# Patient Record
Sex: FEMALE | Race: WHITE | NOT HISPANIC OR LATINO | Employment: PART TIME | URBAN - METROPOLITAN AREA
[De-identification: names, ages, dates, MRNs, and addresses within clinical notes are randomized per-mention and may not be internally consistent; named-entity substitution may affect disease eponyms.]

---

## 2020-01-15 PROBLEM — M54.12 BRACHIAL NEURITIS: Status: ACTIVE | Noted: 2020-01-15

## 2020-10-15 DIAGNOSIS — Z01.818 PREOP TESTING: ICD-10-CM

## 2020-10-25 ENCOUNTER — CLINICAL SUPPORT (OUTPATIENT)
Dept: URGENT CARE | Facility: CLINIC | Age: 55
End: 2020-10-25
Payer: COMMERCIAL

## 2020-10-25 DIAGNOSIS — Z01.818 PREOP TESTING: ICD-10-CM

## 2020-10-25 PROCEDURE — U0003 INFECTIOUS AGENT DETECTION BY NUCLEIC ACID (DNA OR RNA); SEVERE ACUTE RESPIRATORY SYNDROME CORONAVIRUS 2 (SARS-COV-2) (CORONAVIRUS DISEASE [COVID-19]), AMPLIFIED PROBE TECHNIQUE, MAKING USE OF HIGH THROUGHPUT TECHNOLOGIES AS DESCRIBED BY CMS-2020-01-R: HCPCS

## 2020-10-25 PROCEDURE — U0002 PR SARS-COV-2 COVID-19 ANY TECHNIQUE, MULT TYPE/SUBTYPE/TARGET: ICD-10-PCS | Mod: S$GLB,,, | Performed by: NURSE PRACTITIONER

## 2020-10-25 PROCEDURE — U0002 COVID-19 LAB TEST NON-CDC: HCPCS | Mod: S$GLB,,, | Performed by: NURSE PRACTITIONER

## 2020-10-26 LAB — SARS-COV-2 RNA RESP QL NAA+PROBE: NOT DETECTED

## 2021-05-10 ENCOUNTER — PATIENT MESSAGE (OUTPATIENT)
Dept: RESEARCH | Facility: HOSPITAL | Age: 56
End: 2021-05-10

## 2023-08-17 ENCOUNTER — OFFICE VISIT (OUTPATIENT)
Dept: PAIN MEDICINE | Facility: CLINIC | Age: 58
End: 2023-08-17
Payer: COMMERCIAL

## 2023-08-17 VITALS
DIASTOLIC BLOOD PRESSURE: 69 MMHG | HEIGHT: 61 IN | BODY MASS INDEX: 22.15 KG/M2 | WEIGHT: 117.31 LBS | HEART RATE: 78 BPM | SYSTOLIC BLOOD PRESSURE: 140 MMHG

## 2023-08-17 DIAGNOSIS — M54.12 CERVICAL RADICULOPATHY: ICD-10-CM

## 2023-08-17 DIAGNOSIS — M54.12 CERVICAL RADICULITIS: Primary | ICD-10-CM

## 2023-08-17 PROCEDURE — 1159F MED LIST DOCD IN RCRD: CPT | Mod: CPTII,S$GLB,, | Performed by: ANESTHESIOLOGY

## 2023-08-17 PROCEDURE — 3077F SYST BP >= 140 MM HG: CPT | Mod: CPTII,S$GLB,, | Performed by: ANESTHESIOLOGY

## 2023-08-17 PROCEDURE — 3008F BODY MASS INDEX DOCD: CPT | Mod: CPTII,S$GLB,, | Performed by: ANESTHESIOLOGY

## 2023-08-17 PROCEDURE — 3077F PR MOST RECENT SYSTOLIC BLOOD PRESSURE >= 140 MM HG: ICD-10-PCS | Mod: CPTII,S$GLB,, | Performed by: ANESTHESIOLOGY

## 2023-08-17 PROCEDURE — 99999 PR PBB SHADOW E&M-EST. PATIENT-LVL III: ICD-10-PCS | Mod: PBBFAC,,, | Performed by: ANESTHESIOLOGY

## 2023-08-17 PROCEDURE — 3078F PR MOST RECENT DIASTOLIC BLOOD PRESSURE < 80 MM HG: ICD-10-PCS | Mod: CPTII,S$GLB,, | Performed by: ANESTHESIOLOGY

## 2023-08-17 PROCEDURE — 3078F DIAST BP <80 MM HG: CPT | Mod: CPTII,S$GLB,, | Performed by: ANESTHESIOLOGY

## 2023-08-17 PROCEDURE — 99999 PR PBB SHADOW E&M-EST. PATIENT-LVL III: CPT | Mod: PBBFAC,,, | Performed by: ANESTHESIOLOGY

## 2023-08-17 PROCEDURE — 1159F PR MEDICATION LIST DOCUMENTED IN MEDICAL RECORD: ICD-10-PCS | Mod: CPTII,S$GLB,, | Performed by: ANESTHESIOLOGY

## 2023-08-17 PROCEDURE — 99204 PR OFFICE/OUTPT VISIT, NEW, LEVL IV, 45-59 MIN: ICD-10-PCS | Mod: S$GLB,,, | Performed by: ANESTHESIOLOGY

## 2023-08-17 PROCEDURE — 99204 OFFICE O/P NEW MOD 45 MIN: CPT | Mod: S$GLB,,, | Performed by: ANESTHESIOLOGY

## 2023-08-17 PROCEDURE — 3008F PR BODY MASS INDEX (BMI) DOCUMENTED: ICD-10-PCS | Mod: CPTII,S$GLB,, | Performed by: ANESTHESIOLOGY

## 2023-08-17 RX ORDER — GABAPENTIN 300 MG/1
300 CAPSULE ORAL NIGHTLY
Qty: 30 CAPSULE | Refills: 2 | Status: SHIPPED | OUTPATIENT
Start: 2023-08-17 | End: 2024-08-16

## 2023-08-17 RX ORDER — SODIUM CHLORIDE, SODIUM LACTATE, POTASSIUM CHLORIDE, CALCIUM CHLORIDE 600; 310; 30; 20 MG/100ML; MG/100ML; MG/100ML; MG/100ML
INJECTION, SOLUTION INTRAVENOUS CONTINUOUS
Status: CANCELLED | OUTPATIENT
Start: 2023-09-01

## 2023-08-17 NOTE — H&P (VIEW-ONLY)
This note was completed with dictation software and grammatical errors may exist.    Chief Complaint   Patient presents with    Neck Pain    Shoulder Pain    Arm Pain        HPI: Kiki Milner is a 57 y.o. year old female patient who has a past medical history of Arm pain, left, PONV (postoperative nausea and vomiting), and Shoulder pain. She presents in referral from [facility] for left neck and arm pain.  Patient reports that beginning in 2019 she was having severe left neck pain and had gone through physical therapy, medications without relief.  She ended up having a cervical epidural steroid injection and her pain resolved for several years.  She reports that a couple years later her pain returned, had done some injections without much relief but it was moderately well controlled until recently.  In the last 6 months it has been worsening, located in the left neck but now it is radiating down the left arm causing numbness and tingling in her 2nd through 5th digits.  Pain is into her left scapula as well.  She states that the pain is fairly constant but becomes more severe especially with long work days.  She describes the pain as aching, throbbing, tight and deep.  It progresses as the day goes on.  She does get some relief with rest, lying down and Aleve.  She had her last injection in the fall of 2022 but did not have relief and so after this had a new MRI done that we reviewed below.  She denies any dennis weakness, no bowel or bladder incontinence, no balance changes.      Pain intervention history:  She has had epidurals in the past the 1st cervical epidural in 2019 provided excellent relief.  However subsequent injections several times did not provide as much benefit.    Spine surgeries:    Antineuropathics:  NSAIDs:  Aleve  Physical therapy:  She had done physical therapy 4 years ago for several months without relief.  Recently she has been doing the exercises that she has learned through physician  "supervision, has done more than several months of exercise without benefit.  Antidepressants:  Muscle relaxers:  Opioids:  Antiplatelets/Anticoagulants:        ROS:  She reports headaches, neck pain.  Balance of review of systems is negative.    No results found for: "LABA1C", "HGBA1C"    Lab Results   Component Value Date    WBC 5.7 09/22/2008    HGB 12.3 09/22/2008    HCT 35.5 (L) 09/22/2008    MCV 92.3 09/22/2008     (H) 09/22/2008             Past Medical History:   Diagnosis Date    Arm pain, left     PONV (postoperative nausea and vomiting)     Shoulder pain        Past Surgical History:   Procedure Laterality Date    breast augmentation      EPIDURAL STEROID INJECTION INTO CERVICAL SPINE N/A 1/15/2020    Procedure: INJECTION, STEROID, SPINE, CERVICAL, EPIDURAL c7-T1;  Surgeon: Bryant Kaur MD;  Location: [facility];  Service: Pain Management;  Laterality: N/A;    RHINOPLASTY         Social History     Socioeconomic History    Marital status:    Tobacco Use    Smoking status: Never    Smokeless tobacco: Never   Substance and Sexual Activity    Alcohol use: No    Drug use: No         Medications/Allergies: See med card    Vitals:    08/17/23 1031   BP: (!) 140/69   Pulse: 78   Weight: 53.2 kg (117 lb 4.6 oz)   Height: 5' 1" (1.549 m)   PainSc:   4   PainLoc: Back     Body mass index is 22.16 kg/m².    Physical exam:  Gen: A and O x3, pleasant, well-groomed  Skin: No rashes or obvious lesions  HEENT: PERRLA, no obvious deformities on ears or in canals.Trachea midline.  CVS: Regular rate and rhythm, normal palpable pulses.  Resp: Clear to auscultation bilaterally, no wheezes or rales.  Abdomen: Soft, NT/ND.  Musculoskeletal:  No antalgic gait.       Neuro:  Motor:    Right Left   C4 Shoulder Abduction  5  5   C5 Elbow Flexion    5  5   C6 Wrist Extension  5  5   C7 Elbow Extension   5  5   C8/T1 Hand Intrinsics   5  5   C8 First Dorsal Interosseus  5  5   C8 Abductor Pollicus Brevis  5  5      " Left  Right    Triceps DTR 2+ 2+   Biceps DTR 2+ 2+   Brachioradialis DTR 2+ 2+   Patellar DTR 2+ 2+   Achilles DTR 2+ 2+   Olivo Absent  Absent   Clonus Absent Absent     Babinski Absent Absent     Sensory: Intact and symmetrical to light touch and pinprick in C2-T1 dermatomes bilaterally.  Cervical spine: ROM is full in flexion, extension and lateral rotation with increased pain on left lateral rotation and extension, also with flexion   Spurling's maneuver causes no neck pain to either side.  Myofascial exam: No Tenderness to palpation across cervical paraspinous region bilaterally.    Imaging:  We reviewed cervical MRIs from 2019 and in 2022: Last was 10/11/2022 from [place]: Most notably there are broad-based disc bulges at C5/6 with leftward protrusion at C5/6 towards the cord but right greater than left foraminal narrowing at that level.  At C6/7 there is moderate to severe foraminal narrowing out to the left side.    Assessment:  Kiki Milner is a 57 y.o. year old female patient who has a past medical history of Arm pain, left, PONV (postoperative nausea and vomiting), and Shoulder pain. She presents in referral from [facility] for left neck and arm pain.  1. Cervical radiculitis        2. Cervical radiculopathy  Vital signs    Place 18-22 gaua peripheral IV     Verify informed consent    Notify physician     Notify physician     Notify physician (specify)    Diet NPO    Case Request Operating Room: Injection-steroid-epidural-cervical C6/7 TO LEFT    Place in Outpatient    lactated ringers infusion          Plan:  1. We reviewed her symptoms, exam and cervical spine MRIs.  She does have disc degeneration at C5/6 which pushes out to the left side of the cord but also has significant foraminal narrowing at C6/7 which I think is likely causing the symptoms into her left arm.  We discussed the role of epidural steroid injections and she would like to proceed.  We discussed that she has done  exercises, I would also try gabapentin 300 mg nightly.  We discussed that if epidural steroid injections are not helping her radicular pain, likely the only other option would be surgery but she feels that her pain is probably not at that point but she would definitely like to see if her pain can be reduced right now.  I am going to set her up with a interlaminar C6/7 JEFFREY with spread to the left side.  We will see her in follow-up in several weeks after the injection or sooner as needed.  Thank you for referring this interesting patient, and I look forward to continuing to collaborate in her care.

## 2023-08-17 NOTE — PROGRESS NOTES
This note was completed with dictation software and grammatical errors may exist.    Chief Complaint   Patient presents with    Neck Pain    Shoulder Pain    Arm Pain        HPI: Kiki Milner is a 57 y.o. year old female patient who has a past medical history of Arm pain, left, PONV (postoperative nausea and vomiting), and Shoulder pain. She presents in referral from [facility] for left neck and arm pain.  Patient reports that beginning in 2019 she was having severe left neck pain and had gone through physical therapy, medications without relief.  She ended up having a cervical epidural steroid injection and her pain resolved for several years.  She reports that a couple years later her pain returned, had done some injections without much relief but it was moderately well controlled until recently.  In the last 6 months it has been worsening, located in the left neck but now it is radiating down the left arm causing numbness and tingling in her 2nd through 5th digits.  Pain is into her left scapula as well.  She states that the pain is fairly constant but becomes more severe especially with long work days.  She describes the pain as aching, throbbing, tight and deep.  It progresses as the day goes on.  She does get some relief with rest, lying down and Aleve.  She had her last injection in the fall of 2022 but did not have relief and so after this had a new MRI done that we reviewed below.  She denies any dennis weakness, no bowel or bladder incontinence, no balance changes.      Pain intervention history:  She has had epidurals in the past the 1st cervical epidural in 2019 provided excellent relief.  However subsequent injections several times did not provide as much benefit.    Spine surgeries:    Antineuropathics:  NSAIDs:  Aleve  Physical therapy:  She had done physical therapy 4 years ago for several months without relief.  Recently she has been doing the exercises that she has learned through physician  "supervision, has done more than several months of exercise without benefit.  Antidepressants:  Muscle relaxers:  Opioids:  Antiplatelets/Anticoagulants:        ROS:  She reports headaches, neck pain.  Balance of review of systems is negative.    No results found for: "LABA1C", "HGBA1C"    Lab Results   Component Value Date    WBC 5.7 09/22/2008    HGB 12.3 09/22/2008    HCT 35.5 (L) 09/22/2008    MCV 92.3 09/22/2008     (H) 09/22/2008             Past Medical History:   Diagnosis Date    Arm pain, left     PONV (postoperative nausea and vomiting)     Shoulder pain        Past Surgical History:   Procedure Laterality Date    breast augmentation      EPIDURAL STEROID INJECTION INTO CERVICAL SPINE N/A 1/15/2020    Procedure: INJECTION, STEROID, SPINE, CERVICAL, EPIDURAL c7-T1;  Surgeon: Bryant Kaur MD;  Location: [facility];  Service: Pain Management;  Laterality: N/A;    RHINOPLASTY         Social History     Socioeconomic History    Marital status:    Tobacco Use    Smoking status: Never    Smokeless tobacco: Never   Substance and Sexual Activity    Alcohol use: No    Drug use: No         Medications/Allergies: See med card    Vitals:    08/17/23 1031   BP: (!) 140/69   Pulse: 78   Weight: 53.2 kg (117 lb 4.6 oz)   Height: 5' 1" (1.549 m)   PainSc:   4   PainLoc: Back     Body mass index is 22.16 kg/m².    Physical exam:  Gen: A and O x3, pleasant, well-groomed  Skin: No rashes or obvious lesions  HEENT: PERRLA, no obvious deformities on ears or in canals.Trachea midline.  CVS: Regular rate and rhythm, normal palpable pulses.  Resp: Clear to auscultation bilaterally, no wheezes or rales.  Abdomen: Soft, NT/ND.  Musculoskeletal:  No antalgic gait.       Neuro:  Motor:    Right Left   C4 Shoulder Abduction  5  5   C5 Elbow Flexion    5  5   C6 Wrist Extension  5  5   C7 Elbow Extension   5  5   C8/T1 Hand Intrinsics   5  5   C8 First Dorsal Interosseus  5  5   C8 Abductor Pollicus Brevis  5  5      " Left  Right    Triceps DTR 2+ 2+   Biceps DTR 2+ 2+   Brachioradialis DTR 2+ 2+   Patellar DTR 2+ 2+   Achilles DTR 2+ 2+   Olivo Absent  Absent   Clonus Absent Absent     Babinski Absent Absent     Sensory: Intact and symmetrical to light touch and pinprick in C2-T1 dermatomes bilaterally.  Cervical spine: ROM is full in flexion, extension and lateral rotation with increased pain on left lateral rotation and extension, also with flexion   Spurling's maneuver causes no neck pain to either side.  Myofascial exam: No Tenderness to palpation across cervical paraspinous region bilaterally.    Imaging:  We reviewed cervical MRIs from 2019 and in 2022: Last was 10/11/2022 from [place]: Most notably there are broad-based disc bulges at C5/6 with leftward protrusion at C5/6 towards the cord but right greater than left foraminal narrowing at that level.  At C6/7 there is moderate to severe foraminal narrowing out to the left side.    Assessment:  Kiki Milner is a 57 y.o. year old female patient who has a past medical history of Arm pain, left, PONV (postoperative nausea and vomiting), and Shoulder pain. She presents in referral from [facility] for left neck and arm pain.  1. Cervical radiculitis        2. Cervical radiculopathy  Vital signs    Place 18-22 gaua peripheral IV     Verify informed consent    Notify physician     Notify physician     Notify physician (specify)    Diet NPO    Case Request Operating Room: Injection-steroid-epidural-cervical C6/7 TO LEFT    Place in Outpatient    lactated ringers infusion          Plan:  1. We reviewed her symptoms, exam and cervical spine MRIs.  She does have disc degeneration at C5/6 which pushes out to the left side of the cord but also has significant foraminal narrowing at C6/7 which I think is likely causing the symptoms into her left arm.  We discussed the role of epidural steroid injections and she would like to proceed.  We discussed that she has done  exercises, I would also try gabapentin 300 mg nightly.  We discussed that if epidural steroid injections are not helping her radicular pain, likely the only other option would be surgery but she feels that her pain is probably not at that point but she would definitely like to see if her pain can be reduced right now.  I am going to set her up with a interlaminar C6/7 JEFFREY with spread to the left side.  We will see her in follow-up in several weeks after the injection or sooner as needed.  Thank you for referring this interesting patient, and I look forward to continuing to collaborate in her care.

## 2023-09-01 ENCOUNTER — HOSPITAL ENCOUNTER (OUTPATIENT)
Dept: RADIOLOGY | Facility: HOSPITAL | Age: 58
Discharge: HOME | End: 2023-09-01
Attending: ANESTHESIOLOGY | Admitting: ANESTHESIOLOGY
Payer: COMMERCIAL

## 2023-09-01 ENCOUNTER — HOSPITAL ENCOUNTER (OUTPATIENT)
Facility: HOSPITAL | Age: 58
Discharge: HOME | End: 2023-09-01
Attending: ANESTHESIOLOGY | Admitting: ANESTHESIOLOGY
Payer: COMMERCIAL

## 2023-09-01 DIAGNOSIS — M54.2 NECK PAIN: ICD-10-CM

## 2023-09-01 DIAGNOSIS — M54.12 CERVICAL RADICULOPATHY: ICD-10-CM

## 2023-09-01 PROCEDURE — 25500020 PHARM REV CODE 255: Mod: PO | Performed by: ANESTHESIOLOGY

## 2023-09-01 PROCEDURE — 76000 FLUOROSCOPY <1 HR PHYS/QHP: CPT | Mod: TC,PO

## 2023-09-01 PROCEDURE — 62321 PR INJ CERV/THORAC, W/GUIDANCE: ICD-10-PCS | Mod: ,,, | Performed by: ANESTHESIOLOGY

## 2023-09-01 PROCEDURE — 62321 NJX INTERLAMINAR CRV/THRC: CPT | Mod: ,,, | Performed by: ANESTHESIOLOGY

## 2023-09-01 PROCEDURE — 63600175 PHARM REV CODE 636 W HCPCS: Mod: PO | Performed by: ANESTHESIOLOGY

## 2023-09-01 PROCEDURE — 25000003 PHARM REV CODE 250: Mod: PO | Performed by: ANESTHESIOLOGY

## 2023-09-01 PROCEDURE — 62321 NJX INTERLAMINAR CRV/THRC: CPT | Mod: PO | Performed by: ANESTHESIOLOGY

## 2023-09-01 RX ORDER — MIDAZOLAM HYDROCHLORIDE 2 MG/2ML
INJECTION, SOLUTION INTRAMUSCULAR; INTRAVENOUS
Status: DISCONTINUED | OUTPATIENT
Start: 2023-09-01 | End: 2023-09-01 | Stop reason: HOSPADM

## 2023-09-01 RX ORDER — METHYLPREDNISOLONE ACETATE 80 MG/ML
INJECTION, SUSPENSION INTRA-ARTICULAR; INTRALESIONAL; INTRAMUSCULAR; SOFT TISSUE
Status: DISCONTINUED | OUTPATIENT
Start: 2023-09-01 | End: 2023-09-01 | Stop reason: HOSPADM

## 2023-09-01 RX ORDER — SODIUM CHLORIDE, SODIUM LACTATE, POTASSIUM CHLORIDE, CALCIUM CHLORIDE 600; 310; 30; 20 MG/100ML; MG/100ML; MG/100ML; MG/100ML
INJECTION, SOLUTION INTRAVENOUS CONTINUOUS
Status: DISCONTINUED | OUTPATIENT
Start: 2023-09-01 | End: 2023-09-01 | Stop reason: HOSPADM

## 2023-09-01 RX ORDER — LIDOCAINE HYDROCHLORIDE 10 MG/ML
INJECTION, SOLUTION EPIDURAL; INFILTRATION; INTRACAUDAL; PERINEURAL
Status: DISCONTINUED | OUTPATIENT
Start: 2023-09-01 | End: 2023-09-01 | Stop reason: HOSPADM

## 2023-09-01 RX ADMIN — SODIUM CHLORIDE, POTASSIUM CHLORIDE, SODIUM LACTATE AND CALCIUM CHLORIDE: 600; 310; 30; 20 INJECTION, SOLUTION INTRAVENOUS at 02:09

## 2023-09-01 NOTE — INTERVAL H&P NOTE
The patient has been examined and the H&P has been reviewed:    I concur with the findings and no changes have occurred since H&P was written.    Procedure risks, benefits and alternative options discussed and understood by patient/family.    ASA 2, mallampati 2      There are no hospital problems to display for this patient.

## 2023-09-01 NOTE — OP NOTE
PROCEDURE DATE: 9/1/2023    Procedure: C7-T1 cervical interlaminar epidural steroid injection under utilizing fluoroscopy.    Diagnosis: Cervical Radiculopathy    POSTOP DIAGNOSIS: SAME    Physician: Wayne Hammonds MD    Medications injected:  Methylprednisone 80mg followed by a slow injection of 4 mL sterile, preservative-free normal saline.    Local anesthetic used: Lidocaine 1%, 3 ml.    Sedation Medications: 4mg versed    Complications:  none    Estimated blood loss: none    Technique:  A time-out was taken to identify patient and procedure prior to starting the procedure.  With the patient laying in a prone position with the neck in a mid-flexed forward position, the area was prepped and draped in the usual sterile fashion using ChloraPrep and a fenestrated drape.  The area was determined under AP fluoroscopic guidance.  Local anesthetic was given using a 25-gauge 1.5 inch needle by raising a wheal and then infiltrating ventrally.  A 3.5 inch 20-gauge Touhy needle was introduced under fluoroscopic guidance to meet the lamina of C7.  The needle was then hinged under the lamina then advanced using loss of resistance technique.  Once the tip of the needle was in the desired position, the contrast dye Omnipaque was injected to determine placement and no uptake.  The steroid was then injected slowly followed by a slow injection of 4 mL of the sterile preservative-free normal saline.  The patient tolerated the procedure well.    The patient was monitored after the procedure and was given post-procedure and discharge instructions to follow at home. The patient was discharged in a stable condition.

## 2023-09-01 NOTE — DISCHARGE SUMMARY
[place] - Surgery  Discharge Note  Short Stay    Procedure(s) (LRB):  Injection-steroid-epidural-cervical C6/7 TO LEFT (N/A)      OUTCOME: Patient tolerated treatment/procedure well without complication and is now ready for discharge.    DISPOSITION: Home or Self Care    FINAL DIAGNOSIS:  Cervical radiculopathy    FOLLOWUP: In clinic    DISCHARGE INSTRUCTIONS:    Discharge Procedure Orders   Diet Adult Regular     No dressing needed     Notify your health care provider if you experience any of the following:  temperature >100.4     Activity as tolerated

## 2023-09-05 VITALS
BODY MASS INDEX: 22.09 KG/M2 | RESPIRATION RATE: 18 BRPM | OXYGEN SATURATION: 98 % | HEART RATE: 74 BPM | DIASTOLIC BLOOD PRESSURE: 66 MMHG | WEIGHT: 117 LBS | SYSTOLIC BLOOD PRESSURE: 118 MMHG | HEIGHT: 61 IN | TEMPERATURE: 98 F

## 2023-09-25 ENCOUNTER — OFFICE VISIT (OUTPATIENT)
Dept: PAIN MEDICINE | Facility: CLINIC | Age: 58
End: 2023-09-25
Payer: COMMERCIAL

## 2023-09-25 VITALS
HEIGHT: 61 IN | DIASTOLIC BLOOD PRESSURE: 71 MMHG | RESPIRATION RATE: 18 BRPM | BODY MASS INDEX: 22.58 KG/M2 | HEART RATE: 86 BPM | WEIGHT: 119.63 LBS | SYSTOLIC BLOOD PRESSURE: 128 MMHG

## 2023-09-25 DIAGNOSIS — M50.30 DDD (DEGENERATIVE DISC DISEASE), CERVICAL: ICD-10-CM

## 2023-09-25 DIAGNOSIS — M54.12 CERVICAL RADICULOPATHY: Primary | ICD-10-CM

## 2023-09-25 PROCEDURE — 1159F MED LIST DOCD IN RCRD: CPT | Mod: CPTII,S$GLB,, | Performed by: PHYSICIAN ASSISTANT

## 2023-09-25 PROCEDURE — 3078F DIAST BP <80 MM HG: CPT | Mod: CPTII,S$GLB,, | Performed by: PHYSICIAN ASSISTANT

## 2023-09-25 PROCEDURE — 1159F PR MEDICATION LIST DOCUMENTED IN MEDICAL RECORD: ICD-10-PCS | Mod: CPTII,S$GLB,, | Performed by: PHYSICIAN ASSISTANT

## 2023-09-25 PROCEDURE — 1160F RVW MEDS BY RX/DR IN RCRD: CPT | Mod: CPTII,S$GLB,, | Performed by: PHYSICIAN ASSISTANT

## 2023-09-25 PROCEDURE — 1160F PR REVIEW ALL MEDS BY PRESCRIBER/CLIN PHARMACIST DOCUMENTED: ICD-10-PCS | Mod: CPTII,S$GLB,, | Performed by: PHYSICIAN ASSISTANT

## 2023-09-25 PROCEDURE — 99999 PR PBB SHADOW E&M-EST. PATIENT-LVL III: ICD-10-PCS | Mod: PBBFAC,,, | Performed by: PHYSICIAN ASSISTANT

## 2023-09-25 PROCEDURE — 99999 PR PBB SHADOW E&M-EST. PATIENT-LVL III: CPT | Mod: PBBFAC,,, | Performed by: PHYSICIAN ASSISTANT

## 2023-09-25 PROCEDURE — 3074F PR MOST RECENT SYSTOLIC BLOOD PRESSURE < 130 MM HG: ICD-10-PCS | Mod: CPTII,S$GLB,, | Performed by: PHYSICIAN ASSISTANT

## 2023-09-25 PROCEDURE — 3078F PR MOST RECENT DIASTOLIC BLOOD PRESSURE < 80 MM HG: ICD-10-PCS | Mod: CPTII,S$GLB,, | Performed by: PHYSICIAN ASSISTANT

## 2023-09-25 PROCEDURE — 3008F BODY MASS INDEX DOCD: CPT | Mod: CPTII,S$GLB,, | Performed by: PHYSICIAN ASSISTANT

## 2023-09-25 PROCEDURE — 99212 PR OFFICE/OUTPT VISIT, EST, LEVL II, 10-19 MIN: ICD-10-PCS | Mod: S$GLB,,, | Performed by: PHYSICIAN ASSISTANT

## 2023-09-25 PROCEDURE — 99212 OFFICE O/P EST SF 10 MIN: CPT | Mod: S$GLB,,, | Performed by: PHYSICIAN ASSISTANT

## 2023-09-25 PROCEDURE — 3074F SYST BP LT 130 MM HG: CPT | Mod: CPTII,S$GLB,, | Performed by: PHYSICIAN ASSISTANT

## 2023-09-25 PROCEDURE — 3008F PR BODY MASS INDEX (BMI) DOCUMENTED: ICD-10-PCS | Mod: CPTII,S$GLB,, | Performed by: PHYSICIAN ASSISTANT

## 2023-09-25 NOTE — PROGRESS NOTES
This note was completed with dictation software and grammatical errors may exist.    Chief Complaint   Patient presents with    Follow-up        HPI: Kiki Milner is a 57 y.o. year old female patient who has a past medical history of Arm pain, left, PONV (postoperative nausea and vomiting), and Shoulder pain. She presents in referral from No ref. provider found for left neck and arm pain.  She is status post C6/7 interlaminar epidural steroid injection to the left on 09/01/2023 with at least 60% relief.  The patient is new to me.  She describes cramping and throbbing pain in the left neck radiating to the left scapular region.  She also reports tingling in her hand.  She does now have more good days than bad days and nighttime is better now than it was prior to the injection.  She does have some increased pain at the end of the day after work but not every day.  She did not start gabapentin.  She states that she tends not to do well with medications but she may consider starting this in the future.  Overall her symptoms are tolerable.  She denies weakness, numbness, bladder or bowel incontinence.    Initial history:  Patient reports that beginning in 2019 she was having severe left neck pain and had gone through physical therapy, medications without relief.  She ended up having a cervical epidural steroid injection and her pain resolved for several years.  She reports that a couple years later her pain returned, had done some injections without much relief but it was moderately well controlled until recently.  In the last 6 months it has been worsening, located in the left neck but now it is radiating down the left arm causing numbness and tingling in her 2nd through 5th digits.  Pain is into her left scapula as well.  She states that the pain is fairly constant but becomes more severe especially with long work days.  She describes the pain as aching, throbbing, tight and deep.  It progresses as the day goes on.   "She does get some relief with rest, lying down and Aleve.  She had her last injection in the fall of 2022 but did not have relief and so after this had a new MRI done that we reviewed below.  She denies any dennis weakness, no bowel or bladder incontinence, no balance changes.    Pain intervention history:  She has had epidurals in the past the 1st cervical epidural in 2019 provided excellent relief.  However subsequent injections several times did not provide as much benefit.    Spine surgeries:    Antineuropathics:  NSAIDs:  Aleve  Physical therapy:  She had done physical therapy 4 years ago for several months without relief.  Recently she has been doing the exercises that she has learned through physician supervision, has done more than several months of exercise without benefit.  Antidepressants:  Muscle relaxers:  Opioids:  Antiplatelets/Anticoagulants:    ROS:  She reports headaches, neck pain.  Balance of review of systems is negative.    No results found for: "LABA1C", "HGBA1C"    Lab Results   Component Value Date    WBC 5.7 09/22/2008    HGB 12.3 09/22/2008    HCT 35.5 (L) 09/22/2008    MCV 92.3 09/22/2008     (H) 09/22/2008             Past Medical History:   Diagnosis Date    Arm pain, left     PONV (postoperative nausea and vomiting)     Shoulder pain        Past Surgical History:   Procedure Laterality Date    breast augmentation      EPIDURAL STEROID INJECTION INTO CERVICAL SPINE N/A 1/15/2020    Procedure: INJECTION, STEROID, SPINE, CERVICAL, EPIDURAL c7-T1;  Surgeon: Bryant Kaur MD;  Location: [facility];  Service: Pain Management;  Laterality: N/A;    EPIDURAL STEROID INJECTION INTO CERVICAL SPINE N/A 9/1/2023    Procedure: Injection-steroid-epidural-cervical C6/7 TO LEFT;  Surgeon: Wayne Hammonds MD;  Location: [facility];  Service: Pain Management;  Laterality: N/A;    RHINOPLASTY         Social History     Socioeconomic History    Marital status:    Tobacco Use    Smoking " "status: Never    Smokeless tobacco: Never   Substance and Sexual Activity    Alcohol use: No    Drug use: No         Medications/Allergies: See med card    Vitals:    09/25/23 0749   BP: 128/71   Pulse: 86   Resp: 18   Weight: 54.2 kg (119 lb 9.6 oz)   Height: 5' 1" (1.549 m)   PainSc:   4   PainLoc: Neck     Body mass index is 22.6 kg/m².    Physical exam:  Gen: A and O x3, pleasant, well-groomed  Skin: No rashes or obvious lesions  HEENT: PERRLA, no obvious deformities on ears or in canals.Trachea midline.  CVS: Regular rate and rhythm, normal palpable pulses.  Resp: Clear to auscultation bilaterally, no wheezes or rales.  Abdomen: Soft, NT/ND.  Musculoskeletal:  No antalgic gait.       Neuro:  Motor:    Right Left   C4 Shoulder Abduction  5  5   C5 Elbow Flexion    5  5   C6 Wrist Extension  5  5   C7 Elbow Extension   5  5   C8/T1 Hand Intrinsics   5  5   C8 First Dorsal Interosseus  5  5   C8 Abductor Pollicus Brevis  5  5      Left  Right    Triceps DTR 2+ 2+   Biceps DTR 2+ 2+   Brachioradialis DTR 2+ 2+   Patellar DTR 2+ 2+   Achilles DTR 2+ 2+   Olivo Absent  Absent   Clonus Absent Absent     Babinski Absent Absent     Sensory: Intact and symmetrical to light touch and pinprick in C2-T1 dermatomes bilaterally.  Cervical spine: ROM is full in flexion, extension and lateral rotation with increased pain on left lateral rotation and extension, also with flexion  Spurling's maneuver causes no neck pain to either side.  Myofascial exam: No Tenderness to palpation across cervical paraspinous region bilaterally.    Imaging:  We reviewed cervical MRIs from 2019 and in 2022: Last was 10/11/2022 from [place]: Most notably there are broad-based disc bulges at C5/6 with leftward protrusion at C5/6 towards the cord but right greater than left foraminal narrowing at that level.  At C6/7 there is moderate to severe foraminal narrowing out to the left side.    Assessment:  Kiki Milner is a 57 y.o. year old female " patient who has a past medical history of Arm pain, left, PONV (postoperative nausea and vomiting), and Shoulder pain. She presents in referral from [facility] for left neck and arm pain.  1. Cervical radiculopathy        2. DDD (degenerative disc disease), cervical            Plan:  1. The patient did well following the C6/7 interlaminar epidural steroid injection to the left.  She can contact us to repeat this in the future if necessary.  2. She will consider taking gabapentin if her symptoms worsen.    3. Follow-up as needed.

## 2025-01-16 ENCOUNTER — PATIENT MESSAGE (OUTPATIENT)
Dept: PAIN MEDICINE | Facility: CLINIC | Age: 60
End: 2025-01-16
Payer: COMMERCIAL

## 2025-03-11 ENCOUNTER — PATIENT MESSAGE (OUTPATIENT)
Dept: PAIN MEDICINE | Facility: CLINIC | Age: 60
End: 2025-03-11
Payer: COMMERCIAL

## 2025-03-17 ENCOUNTER — OFFICE VISIT (OUTPATIENT)
Dept: PAIN MEDICINE | Facility: CLINIC | Age: 60
End: 2025-03-17
Payer: COMMERCIAL

## 2025-03-17 ENCOUNTER — TELEPHONE (OUTPATIENT)
Dept: PAIN MEDICINE | Facility: CLINIC | Age: 60
End: 2025-03-17
Payer: COMMERCIAL

## 2025-03-17 VITALS
BODY MASS INDEX: 23.25 KG/M2 | SYSTOLIC BLOOD PRESSURE: 119 MMHG | HEIGHT: 61 IN | DIASTOLIC BLOOD PRESSURE: 67 MMHG | HEART RATE: 79 BPM | WEIGHT: 123.13 LBS

## 2025-03-17 DIAGNOSIS — M47.812 CERVICAL SPONDYLOSIS: ICD-10-CM

## 2025-03-17 DIAGNOSIS — M54.12 CERVICAL RADICULOPATHY: Primary | ICD-10-CM

## 2025-03-17 PROCEDURE — 1159F MED LIST DOCD IN RCRD: CPT | Mod: CPTII,S$GLB,, | Performed by: PHYSICIAN ASSISTANT

## 2025-03-17 PROCEDURE — 99214 OFFICE O/P EST MOD 30 MIN: CPT | Mod: S$GLB,,, | Performed by: PHYSICIAN ASSISTANT

## 2025-03-17 PROCEDURE — 1160F RVW MEDS BY RX/DR IN RCRD: CPT | Mod: CPTII,S$GLB,, | Performed by: PHYSICIAN ASSISTANT

## 2025-03-17 PROCEDURE — 3074F SYST BP LT 130 MM HG: CPT | Mod: CPTII,S$GLB,, | Performed by: PHYSICIAN ASSISTANT

## 2025-03-17 PROCEDURE — 99999 PR PBB SHADOW E&M-EST. PATIENT-LVL III: CPT | Mod: PBBFAC,,, | Performed by: PHYSICIAN ASSISTANT

## 2025-03-17 PROCEDURE — 3078F DIAST BP <80 MM HG: CPT | Mod: CPTII,S$GLB,, | Performed by: PHYSICIAN ASSISTANT

## 2025-03-17 PROCEDURE — 3008F BODY MASS INDEX DOCD: CPT | Mod: CPTII,S$GLB,, | Performed by: PHYSICIAN ASSISTANT

## 2025-03-17 RX ORDER — IBUPROFEN 400 MG/1
400 TABLET ORAL EVERY 6 HOURS PRN
COMMUNITY

## 2025-03-17 NOTE — TELEPHONE ENCOUNTER
Called pt to reschedule todays appt as provider will be out in a mandatory meeting   Pt states she really wants to see brien but will see jacquie nix as she has an opening on her schedule     Pt booked with jacquie nix @4pm today

## 2025-03-20 NOTE — PROGRESS NOTES
[facility] Follow [facility]    This note was completed with dictation software and grammatical errors may exist.    Chief Complaint   Patient presents with    Pain    Neck Pain     Left scapula and 3 point down left arm        HPI: Kiki Milner is a 59 y.o. year old female patient who has a past medical history of Arm pain, left, PONV (postoperative nausea and vomiting), and Shoulder pain. She presents for left neck and arm pain.  She was last seen in pain management for similar symptoms in September 2023.  C7/T1 IL JEFFREY 9/1/23 relieved 60% of the pain at the time.  Symptoms are returning and are more bothersome.  She describes pain in the left neck, shoulder with radiation into the left arm to just past the elbow.  She is constantly aware of a different sensation in the left hand.  Her left arm feels heavy and this is worst at the end of the day.  She has been taking ibuprofen nightly.  In the past she has tried PT with dry needling, chiropractic care - these treatments made pain worse at the time.  She has had JEFFREY with benefit as well.     HPI 9/25/23 with Fede Noriega:  She is status post C6/7 interlaminar epidural steroid injection to the left on 09/01/2023 with at least 60% relief.  The patient is new to me.  She describes cramping and throbbing pain in the left neck radiating to the left scapular region.  She also reports tingling in her hand.  She does now have more good days than bad days and nighttime is better now than it was prior to the injection.  She does have some increased pain at the end of the day after work but not every day.  She did not start gabapentin.  She states that she tends not to do well with medications but she may consider starting this in the future.  Overall her symptoms are tolerable.  She denies weakness, numbness, bladder or bowel incontinence.    Initial history:  Patient reports that beginning in 2019 she was having severe left neck pain and had gone through physical  "therapy, medications without relief.  She ended up having a cervical epidural steroid injection and her pain resolved for several years.  She reports that a couple years later her pain returned, had done some injections without much relief but it was moderately well controlled until recently.  In the last 6 months it has been worsening, located in the left neck but now it is radiating down the left arm causing numbness and tingling in her 2nd through 5th digits.  Pain is into her left scapula as well.  She states that the pain is fairly constant but becomes more severe especially with long work days.  She describes the pain as aching, throbbing, tight and deep.  It progresses as the day goes on.  She does get some relief with rest, lying down and Aleve.  She had her last injection in the fall of 2022 but did not have relief and so after this had a new MRI done that we reviewed below.  She denies any dennis weakness, no bowel or bladder incontinence, no balance changes.    Pain intervention history:    She has had epidurals in the past the 1st cervical epidural in 2019 provided excellent relief.  However subsequent injections several times did not provide as much benefit.  9/1/23 C6/7 IL JEFFREY to the left with at least 60% relief of pain.     Spine surgeries:  none    Medications and PT  Antineuropathics:  NSAIDs:  Aleve  Physical therapy:  She had done physical therapy 4 years ago for several months without relief.  Recently she has been doing the exercises that she has learned through physician supervision, has done more than several months of exercise without benefit.  Antidepressants:  Muscle relaxers:  Opioids:  Antiplatelets/Anticoagulants:    ROS:  She reports headaches, neck pain.  Balance of review of systems is negative.    No results found for: "LABA1C", "HGBA1C"    Lab Results   Component Value Date    WBC 5.7 09/22/2008    HGB 12.3 09/22/2008    HCT 35.5 (L) 09/22/2008    MCV 92.3 09/22/2008     (H) " 09/22/2008             Past Medical History:   Diagnosis Date    Arm pain, left     PONV (postoperative nausea and vomiting)     Shoulder pain        Past Surgical History:   Procedure Laterality Date    breast augmentation      EPIDURAL STEROID INJECTION INTO CERVICAL SPINE N/A 1/15/2020    Procedure: INJECTION, STEROID, SPINE, CERVICAL, EPIDURAL c7-T1;  Surgeon: Bryant Kaur MD;  Location: [facility];  Service: Pain Management;  Laterality: N/A;    EPIDURAL STEROID INJECTION INTO CERVICAL SPINE N/A 9/1/2023    Procedure: Injection-steroid-epidural-cervical C6/7 TO LEFT;  Surgeon: Wayne Hammonds MD;  Location: [facility];  Service: Pain Management;  Laterality: N/A;    RHINOPLASTY         Social History     Socioeconomic History    Marital status:    Tobacco Use    Smoking status: Never    Smokeless tobacco: Never   Substance and Sexual Activity    Alcohol use: No    Drug use: No     Social Drivers of Health     Financial Resource Strain: Low Risk  (7/1/2024)    Received from [facility]    Overall Financial Resource Strain (CARDIA)     Difficulty of Paying Living Expenses: Not hard at all   Food Insecurity: No Food Insecurity (7/1/2024)    Received from [facility]    Hunger Vital Sign     Worried About Running Out of Food in the Last Year: Never true     Ran Out of Food in the Last Year: Never true   Transportation Needs: No Transportation Needs (7/1/2024)    Received from [facility]    PRAPARE - Transportation     Lack of Transportation (Medical): No     Lack of Transportation (Non-Medical): No   Physical Activity: Unknown (7/1/2024)    Received from [facility]    Exercise Vital Sign     Days of Exercise per Week: 2 days   Stress: No Stress Concern Present (7/1/2024)    Received from [facility]    [place] of [employer]ational [employer] - Occupational Stress Questionnaire     Feeling of Stress : Only a little   Housing Stability: Low Risk  (7/1/2024)    Received from [facility]    Housing  "Stability Vital Sign     Unable to Pay for Housing in the Last Year: No     Number of Places Lived in the Last Year: 1     Unstable Housing in the Last Year: No         Medications/Allergies: See med card    Vitals:    03/17/25 1614   BP: 119/67   Pulse: 79   Weight: 55.8 kg (123 lb 2 oz)   Height: 5' 1" (1.549 m)   PainSc:   7   PainLoc: Neck     Body mass index is 23.26 kg/m².    Physical exam:  Gen: A and O x3, pleasant, well-groomed  Skin: No rashes or obvious lesions  HEENT: PERRLA, no obvious deformities on ears or in canals.Trachea midline.  CVS: Regular rate and rhythm, normal palpable pulses.  Resp: Clear to auscultation bilaterally, no wheezes or rales.  Abdomen: Soft, NT/ND.  Musculoskeletal:  No antalgic gait.       Neuro:  Motor:    Right Left   C4 Shoulder Abduction  5  5   C5 Elbow Flexion    5  5   C6 Wrist Extension  5  5   C7 Elbow Extension   5  5   C8/T1 Hand Intrinsics   5  5   C8 First Dorsal Interosseus  5  5   C8 Abductor Pollicus Brevis  5  5      Left  Right    Triceps DTR 2+ 2+   Biceps DTR 2+ 2+   Brachioradialis DTR 2+ 2+   Patellar DTR 2+ 2+   Achilles DTR 2+ 2+   Olivo Absent  Absent   Clonus Absent Absent     Babinski Absent Absent     Sensory: Intact and symmetrical to light touch and pinprick in C2-T1 dermatomes bilaterally.  Cervical spine: ROM is full in flexion, extension and lateral rotation with increased pain on left lateral rotation and extension, also with flexion  Spurling's maneuver causes no neck pain to either side.  Myofascial exam: No Tenderness to palpation across cervical paraspinous region bilaterally.    Imaging:  We reviewed cervical MRIs from 2019 and in 2022: Last was 10/11/2022 from [place]: Most notably there are broad-based disc bulges at C5/6 with leftward protrusion at C5/6 towards the cord but right greater than left foraminal narrowing at that level.  At C6/7 there is moderate to severe foraminal narrowing out to the left side.    Assessment:  Kiki LADD" Annia is a 57 y.o. year old female patient who has a past medical history of Arm pain, left, PONV (postoperative nausea and vomiting), and Shoulder pain. She presents ineck and left arm pain.    Neck and left arm pain from cervical spondylosis at C5/6, C6/7.     1. Cervical radiculopathy        2. Cervical spondylosis            Plan:  1. The patient did well following the C6/7 interlaminar epidural steroid injection to the left in September 2023.  Pain is recurring with neck and left arm pain in the same place.  2.  Discussed proceeding with repeat C6/7 IL JEFFREY to the left.  She is interested.  I will discuss case further with Dr. Hammonds to see if he would like a repeat cervical MRI prior to proceeding as her last one was in 2022.    3.  I will update patient and note once I discuss with .

## 2025-03-24 ENCOUNTER — TELEPHONE (OUTPATIENT)
Dept: PAIN MEDICINE | Facility: CLINIC | Age: 60
End: 2025-03-24
Payer: COMMERCIAL

## 2025-03-24 NOTE — TELEPHONE ENCOUNTER
Kiki called back to return Teresa's phone call. She'll be available through out the day. She'll just have to step out for a call since she's at work

## 2025-03-24 NOTE — TELEPHONE ENCOUNTER
----- Message from [employer] sent at 3/24/2025 11:13 AM CDT -----  Type:  Patient Returning CallWho Called: the patientWho Left Message for Patient: providerDoes the patient know what this is regarding?:yes/noWould the patient rather a call back or a response via [facility]? call back /[facility] Call Back Number:[phone] Additional Information: Thanks

## 2025-03-24 NOTE — TELEPHONE ENCOUNTER
Documentation of C6/7 IL JEFFREY associated with 3/17/25 visit.    Physician - Dr Hammonds    Type of Procedure/Injection - Cervical Epidural  C6/C7 to the left            Laterality - NA      Priority - Normal      Anxiolysis- RNIV      Need to hold medication - Yes      NSAIDs for 2 days          Clearance needed - No      Follow up - 3 week  with Dr. Hammonds

## 2025-03-25 DIAGNOSIS — M54.12 CERVICAL RADICULOPATHY: Primary | ICD-10-CM

## 2025-03-25 RX ORDER — SODIUM CHLORIDE, SODIUM LACTATE, POTASSIUM CHLORIDE, CALCIUM CHLORIDE 600; 310; 30; 20 MG/100ML; MG/100ML; MG/100ML; MG/100ML
INJECTION, SOLUTION INTRAVENOUS CONTINUOUS
OUTPATIENT
Start: 2025-03-25

## 2025-03-25 NOTE — TELEPHONE ENCOUNTER
Spoke with patient and scheduled. Advised to hold NSAIDS x 2 days prior. Pre op information given to patient and follow up appointment scheduled.

## 2025-04-09 ENCOUNTER — PATIENT MESSAGE (OUTPATIENT)
Dept: PAIN MEDICINE | Facility: CLINIC | Age: 60
End: 2025-04-09
Payer: COMMERCIAL

## 2025-04-16 ENCOUNTER — PATIENT MESSAGE (OUTPATIENT)
Dept: PAIN MEDICINE | Facility: CLINIC | Age: 60
End: 2025-04-16
Payer: COMMERCIAL

## 2025-04-17 NOTE — TELEPHONE ENCOUNTER
Spoke with patient and apologized for the miscommunication that Dr. Hammonds had a meeting and that I was unaware she was originally on 4/21. rescheduled procedure for 4/28

## 2025-04-28 ENCOUNTER — HOSPITAL ENCOUNTER (OUTPATIENT)
Facility: HOSPITAL | Age: 60
Discharge: HOME | End: 2025-04-28
Attending: ANESTHESIOLOGY | Admitting: ANESTHESIOLOGY
Payer: COMMERCIAL

## 2025-04-28 ENCOUNTER — HOSPITAL ENCOUNTER (OUTPATIENT)
Dept: RADIOLOGY | Facility: HOSPITAL | Age: 60
Discharge: HOME | End: 2025-04-28
Attending: ANESTHESIOLOGY | Admitting: ANESTHESIOLOGY
Payer: COMMERCIAL

## 2025-04-28 VITALS
DIASTOLIC BLOOD PRESSURE: 65 MMHG | BODY MASS INDEX: 22.28 KG/M2 | OXYGEN SATURATION: 97 % | RESPIRATION RATE: 16 BRPM | SYSTOLIC BLOOD PRESSURE: 118 MMHG | HEART RATE: 76 BPM | WEIGHT: 118 LBS | TEMPERATURE: 98 F | HEIGHT: 61 IN

## 2025-04-28 DIAGNOSIS — M54.2 NECK PAIN: ICD-10-CM

## 2025-04-28 DIAGNOSIS — M54.12 CERVICAL RADICULOPATHY: ICD-10-CM

## 2025-04-28 PROCEDURE — 62321 NJX INTERLAMINAR CRV/THRC: CPT | Mod: ,,, | Performed by: ANESTHESIOLOGY

## 2025-04-28 PROCEDURE — 63600175 PHARM REV CODE 636 W HCPCS: Mod: PO | Performed by: ANESTHESIOLOGY

## 2025-04-28 PROCEDURE — A4216 STERILE WATER/SALINE, 10 ML: HCPCS | Mod: PO | Performed by: ANESTHESIOLOGY

## 2025-04-28 PROCEDURE — 62321 NJX INTERLAMINAR CRV/THRC: CPT | Mod: PO | Performed by: ANESTHESIOLOGY

## 2025-04-28 PROCEDURE — 25000003 PHARM REV CODE 250: Mod: PO | Performed by: ANESTHESIOLOGY

## 2025-04-28 RX ORDER — SODIUM CHLORIDE, SODIUM LACTATE, POTASSIUM CHLORIDE, CALCIUM CHLORIDE 600; 310; 30; 20 MG/100ML; MG/100ML; MG/100ML; MG/100ML
INJECTION, SOLUTION INTRAVENOUS CONTINUOUS
Status: DISCONTINUED | OUTPATIENT
Start: 2025-04-28 | End: 2025-04-28 | Stop reason: HOSPADM

## 2025-04-28 RX ORDER — MIDAZOLAM HYDROCHLORIDE 1 MG/ML
INJECTION INTRAMUSCULAR; INTRAVENOUS
Status: DISCONTINUED | OUTPATIENT
Start: 2025-04-28 | End: 2025-04-28 | Stop reason: HOSPADM

## 2025-04-28 RX ORDER — LIDOCAINE HYDROCHLORIDE 10 MG/ML
INJECTION, SOLUTION EPIDURAL; INFILTRATION; INTRACAUDAL; PERINEURAL
Status: DISCONTINUED | OUTPATIENT
Start: 2025-04-28 | End: 2025-04-28 | Stop reason: HOSPADM

## 2025-04-28 RX ORDER — SODIUM CHLORIDE 9 MG/ML
INJECTION, SOLUTION INTRAVENOUS CONTINUOUS
Status: DISCONTINUED | OUTPATIENT
Start: 2025-04-28 | End: 2025-04-28 | Stop reason: HOSPADM

## 2025-04-28 RX ORDER — METHYLPREDNISOLONE ACETATE 80 MG/ML
INJECTION, SUSPENSION INTRA-ARTICULAR; INTRALESIONAL; INTRAMUSCULAR; SOFT TISSUE
Status: DISCONTINUED | OUTPATIENT
Start: 2025-04-28 | End: 2025-04-28 | Stop reason: HOSPADM

## 2025-04-28 RX ORDER — SODIUM CHLORIDE 9 MG/ML
INJECTION, SOLUTION INTRAMUSCULAR; INTRAVENOUS; SUBCUTANEOUS
Status: DISCONTINUED | OUTPATIENT
Start: 2025-04-28 | End: 2025-04-28 | Stop reason: HOSPADM

## 2025-04-28 RX ADMIN — SODIUM CHLORIDE: 9 INJECTION, SOLUTION INTRAVENOUS at 02:04

## 2025-04-28 NOTE — DISCHARGE SUMMARY
[facility] - Surgery  Discharge Note  Short Stay    Procedure(s) (LRB):  Injection-steroid-epidural-cervical      C6/7 to the left (N/A)      OUTCOME: Patient tolerated treatment/procedure well without complication and is now ready for discharge.    DISPOSITION: Home or Self Care    FINAL DIAGNOSIS:  Cervical radiculopathy    FOLLOWUP: In clinic    DISCHARGE INSTRUCTIONS:    Discharge Procedure Orders   Diet Adult Regular     No dressing needed     Notify your health care provider if you experience any of the following:  temperature >100.4     Activity as tolerated

## 2025-04-28 NOTE — H&P
CC: Neck pain    HPI: The patient is a 60yo woman with a history of cervical radiculopathy here for cervical JEFFREY. There are no major changes in history and physical from 3/17/25.    Past Medical History:   Diagnosis Date    Arm pain, left     PONV (postoperative nausea and vomiting)     Shoulder pain        Past Surgical History:   Procedure Laterality Date    breast augmentation      EPIDURAL STEROID INJECTION INTO CERVICAL SPINE N/A 1/15/2020    Procedure: INJECTION, STEROID, SPINE, CERVICAL, EPIDURAL c7-T1;  Surgeon: Bryant Kaur MD;  Location: [facility];  Service: Pain Management;  Laterality: N/A;    EPIDURAL STEROID INJECTION INTO CERVICAL SPINE N/A 9/1/2023    Procedure: Injection-steroid-epidural-cervical C6/7 TO LEFT;  Surgeon: Wayne Hammonds MD;  Location: [facility];  Service: Pain Management;  Laterality: N/A;    RHINOPLASTY         Family History   Problem Relation Name Age of Onset    No Known Problems Mother      Anesthesia problems Neg Hx      Clotting disorder Neg Hx         Social History     Socioeconomic History    Marital status:    Tobacco Use    Smoking status: Never    Smokeless tobacco: Never   Substance and Sexual Activity    Alcohol use: No    Drug use: No     Social Drivers of Health     Financial Resource Strain: Low Risk  (7/1/2024)    Received from [facility]    Overall Financial Resource Strain (CARDIA)     Difficulty of Paying Living Expenses: Not hard at all   Food Insecurity: No Food Insecurity (7/1/2024)    Received from [facility]    Hunger Vital Sign     Worried About Running Out of Food in the Last Year: Never true     Ran Out of Food in the Last Year: Never true   Transportation Needs: No Transportation Needs (7/1/2024)    Received from [facility]    PRAPARE - Transportation     Lack of Transportation (Medical): No     Lack of Transportation (Non-Medical): No   Physical Activity: Unknown (7/1/2024)    Received from [facility]    Exercise Vital Sign     Days  "of Exercise per Week: 2 days   Stress: No Stress Concern Present (7/1/2024)    Received from [facility] Occupational Health - Occupational Stress Questionnaire     Feeling of Stress : Only a little   Housing Stability: Low Risk  (7/1/2024)    Received from [facility]    Housing Stability Vital Sign     Unable to Pay for Housing in the Last Year: No     Number of Places Lived in the Last Year: 1     Unstable Housing in the Last Year: No       Current Medications[1]    Review of patient's allergies indicates:  No Known Allergies    Vitals:    04/23/25 1119 04/28/25 1441   BP:  132/78   Pulse:  75   Resp:  17   Temp:  97.9 °F (36.6 °C)   TempSrc:  Skin   SpO2:  98%   Weight: 55.8 kg (123 lb) 53.5 kg (118 lb)   Height: 5' 1" (1.549 m) 5' 1" (1.549 m)       ASA 2, Mallampati 2    REVIEW OF SYSTEMS:     GENERAL: No weight loss, malaise or fevers.  HEENT:  No recent changes in vision or hearing  NECK: Negative for lumps, no difficulty with swallowing.  RESPIRATORY: Negative for cough, wheezing or shortness of breath, patient denies any recent URI.  CARDIOVASCULAR: Negative for chest pain, leg swelling or palpitations.  GI: Negative for abdominal discomfort, blood in stools or black stools or change in bowel habits.  MUSCULOSKELETAL: See HPI.  SKIN: Negative for lesions, rash, and itching.  PSYCH: No suicidal or homicidal ideations, no current mood disturbances.  HEMATOLOGY/LYMPHOLOGY: Negative for prolonged bleeding, bruising easily or swollen nodes. Patient is not currently taking any anti-coagulants  ENDO: No history of diabetes or thyroid dysfunction  NEURO: No history of syncope, paralysis, seizures or tremors.All other reviewed and negative other than HPI.    Physical exam:  Gen: A and O x3, pleasant, well-groomed  Skin: No rashes or obvious lesions  HEENT: PERRLA, no obvious deformities on ears or in canals. No thyroid masses, trachea midline, no palpable lymph nodes in neck, axilla.  CVS: " Regular rate and rhythm, normal S1 and S2, no murmurs.  Resp: Clear to auscultation bilaterally.  Abdomen: Soft, NT/ND, normal bowel sounds present.  Musculoskeletal/Neuro: Moving all extremities    Assessment:  Cervical radiculopathy  -     Place in Outpatient; Standing  -     Vital signs; Standing  -     Place 18-22 gauage peripheral IV ; Standing  -     Verify informed consent; Standing  -     Notify physician ; Standing  -     Notify physician ; Standing  -     Notify physician (specify); Standing  -     Diet NPO; Standing  -     lactated ringers infusion    Other orders  -     IP VTE LOW RISK PATIENT; Standing  -     0.9% NaCl infusion               [1]   Current Facility-Administered Medications   Medication Dose Route Frequency Provider Last Rate Last Admin    0.9% NaCl infusion   Intravenous Continuous Wayne Hammonds MD 50 mL/hr at 04/28/25 1453 New Bag at 04/28/25 1453    lactated ringers infusion   Intravenous Continuous Wayne Hammonds MD

## 2025-04-28 NOTE — OP NOTE
PROCEDURE DATE: 4/28/2025    Procedure:C6/7 to the left cervical interlaminar epidural steroid injection under utilizing fluoroscopy.    Diagnosis: Cervical Radiculopathy    POSTOP DIAGNOSIS: SAME    Physician: Wayne Hammonds MD    Medications injected:  Methylprednisone 80mg followed by a slow injection of 4 mL sterile, preservative-free normal saline.    Local anesthetic used: Lidocaine 1%, 4 ml.    Sedation Medications: 4mg versed    Complications:  none    Estimated blood loss: none    Technique:  A time-out was taken to identify patient and procedure prior to starting the procedure.  With the patient laying in a prone position with the neck in a mid-flexed forward position, the area was prepped and draped in the usual sterile fashion using ChloraPrep and a fenestrated drape.  The area was determined under AP fluoroscopic guidance.  Local anesthetic was given using a 25-gauge 1.5 inch needle by raising a wheal and then infiltrating ventrally.  A 3.5 inch 20-gauge Touhy needle was introduced under fluoroscopic guidance to meet the lamina of C7.  The needle was then hinged under the lamina then advanced using loss of resistance technique.  Once the tip of the needle was in the desired position, the contrast dye Omnipaque was injected to determine placement and no uptake.  The steroid was then injected slowly followed by a slow injection of 4 mL of the sterile preservative-free normal saline.  The patient tolerated the procedure well.    The patient was monitored after the procedure and was given post-procedure and discharge instructions to follow at home. The patient was discharged in a stable condition.

## 2025-05-19 ENCOUNTER — OFFICE VISIT (OUTPATIENT)
Dept: PAIN MEDICINE | Facility: CLINIC | Age: 60
End: 2025-05-19
Payer: COMMERCIAL

## 2025-05-19 VITALS
SYSTOLIC BLOOD PRESSURE: 124 MMHG | DIASTOLIC BLOOD PRESSURE: 79 MMHG | BODY MASS INDEX: 24.04 KG/M2 | HEART RATE: 80 BPM | WEIGHT: 127.31 LBS | HEIGHT: 61 IN

## 2025-05-19 DIAGNOSIS — M47.812 CERVICAL SPONDYLOSIS: ICD-10-CM

## 2025-05-19 DIAGNOSIS — M54.12 CERVICAL RADICULOPATHY: Primary | ICD-10-CM

## 2025-05-19 DIAGNOSIS — M54.2 CERVICALGIA: ICD-10-CM

## 2025-05-19 PROCEDURE — 99213 OFFICE O/P EST LOW 20 MIN: CPT | Mod: S$GLB,,, | Performed by: PHYSICIAN ASSISTANT

## 2025-05-19 PROCEDURE — 3078F DIAST BP <80 MM HG: CPT | Mod: CPTII,S$GLB,, | Performed by: PHYSICIAN ASSISTANT

## 2025-05-19 PROCEDURE — 3074F SYST BP LT 130 MM HG: CPT | Mod: CPTII,S$GLB,, | Performed by: PHYSICIAN ASSISTANT

## 2025-05-19 PROCEDURE — 1159F MED LIST DOCD IN RCRD: CPT | Mod: CPTII,S$GLB,, | Performed by: PHYSICIAN ASSISTANT

## 2025-05-19 PROCEDURE — 1160F RVW MEDS BY RX/DR IN RCRD: CPT | Mod: CPTII,S$GLB,, | Performed by: PHYSICIAN ASSISTANT

## 2025-05-19 PROCEDURE — 3008F BODY MASS INDEX DOCD: CPT | Mod: CPTII,S$GLB,, | Performed by: PHYSICIAN ASSISTANT

## 2025-05-19 PROCEDURE — 99999 PR PBB SHADOW E&M-EST. PATIENT-LVL IV: CPT | Mod: PBBFAC,,, | Performed by: PHYSICIAN ASSISTANT

## 2025-05-19 NOTE — PROGRESS NOTES
[facility] Follow Up    This note was completed with dictation software and grammatical errors may exist.    Chief Complaint   Patient presents with    Pain    Neck Pain        HPI: Kiki Milner is a 59 y.o. year old female patient who has a past medical history of Arm pain, left, PONV (postoperative nausea and vomiting), and Shoulder pain. She presents for left neck and arm pain.    History of Present Illness    Patient presents for follow-up of chronic neck and left arm pain. She describes pain starting in her neck and extending down her left side, including her left arm. The pain is characterized as an aching sensation, which she describes as exhausting rather than sharp.    She received an injection approximately two years ago which provided significant relief. However, a recent injection was less effective, only providing about 10% improvement, primarily helping with elbow pain.    She reports ongoing tingling and numbness in her left hand, which persists even after injections. She describes it affecting her whole hand. She also reports a constant sensation from her neck down, feeling on the verge of having a cramp.    Pain significantly impacts her daily activities. By the end of her workday, she feels exhausted and often needs to rest her head on the back of her chair for relief. She reports her head feeling too heavy for her body, causing significant fatigue.    For pain management, she alternates between ibuprofen and naproxen (Aleve), with Aleve providing longer-lasting relief. She typically takes medication at night to prevent pain from waking her up, but recently has started taking it in the morning as well due to increased pain.    Prior to starting injections, she underwent various treatments including chiropractic care, traction, dry needling, and PT. She reports no new symptoms since her last visit, emphasizing that current symptoms are consistent with her previous experiences.    She  has no weakness in the hand, or sharp pain. She has no history of diabetes, high blood pressure, kidney disease, or current pregnancy.        HPI 9/25/23 with Fede Noriega:  She is status post C6/7 interlaminar epidural steroid injection to the left on 09/01/2023 with at least 60% relief.  The patient is new to me.  She describes cramping and throbbing pain in the left neck radiating to the left scapular region.  She also reports tingling in her hand.  She does now have more good days than bad days and nighttime is better now than it was prior to the injection.  She does have some increased pain at the end of the day after work but not every day.  She did not start gabapentin.  She states that she tends not to do well with medications but she may consider starting this in the future.  Overall her symptoms are tolerable.  She denies weakness, numbness, bladder or bowel incontinence.    Initial history:  Patient reports that beginning in 2019 she was having severe left neck pain and had gone through physical therapy, medications without relief.  She ended up having a cervical epidural steroid injection and her pain resolved for several years.  She reports that a couple years later her pain returned, had done some injections without much relief but it was moderately well controlled until recently.  In the last 6 months it has been worsening, located in the left neck but now it is radiating down the left arm causing numbness and tingling in her 2nd through 5th digits.  Pain is into her left scapula as well.  She states that the pain is fairly constant but becomes more severe especially with long work days.  She describes the pain as aching, throbbing, tight and deep.  It progresses as the day goes on.  She does get some relief with rest, lying down and Aleve.  She had her last injection in the fall of 2022 but did not have relief and so after this had a new MRI done that we reviewed below.  She denies any dennis  "weakness, no bowel or bladder incontinence, no balance changes.    Pain intervention history:    She has had epidurals in the past the 1st cervical epidural in 2019 provided excellent relief.  However subsequent injections several times did not provide as much benefit.  9/1/23 C6/7 IL JEFFREY to the left with at least 60% relief of pain.  She is status post C6/7 interlaminar epidural steroid injection to the left on 04/28/2025 with 10% relief.    Spine surgeries:  none    Medications and PT  Antineuropathics:  NSAIDs:  Aleve  Physical therapy:  She had done physical therapy 4 years ago for several months without relief.  Recently she has been doing the exercises that she has learned through physician supervision, has done more than several months of exercise without benefit.  Antidepressants:  Muscle relaxers:  Opioids:  Antiplatelets/Anticoagulants:    ROS:  She reports headaches, neck pain.  Balance of review of systems is negative.    No results found for: "LABA1C", "HGBA1C"    Lab Results   Component Value Date    WBC 5.7 09/22/2008    HGB 12.3 09/22/2008    HCT 35.5 (L) 09/22/2008    MCV 92.3 09/22/2008     (H) 09/22/2008             Past Medical History:   Diagnosis Date    Arm pain, left     PONV (postoperative nausea and vomiting)     Shoulder pain        Past Surgical History:   Procedure Laterality Date    breast augmentation      EPIDURAL STEROID INJECTION INTO CERVICAL SPINE N/A 1/15/2020    Procedure: INJECTION, STEROID, SPINE, CERVICAL, EPIDURAL c7-T1;  Surgeon: Bryant Kaur MD;  Location: [facility];  Service: Pain Management;  Laterality: N/A;    EPIDURAL STEROID INJECTION INTO CERVICAL SPINE N/A 9/1/2023    Procedure: Injection-steroid-epidural-cervical C6/7 TO LEFT;  Surgeon: Wayne Hammonds MD;  Location: [facility];  Service: Pain Management;  Laterality: N/A;    EPIDURAL STEROID INJECTION INTO CERVICAL SPINE N/A 4/28/2025    Procedure: Injection-steroid-epidural-cervical      C6/7 to " "the left;  Surgeon: Wayne Hammonds MD;  Location: [facility];  Service: Pain Management;  Laterality: N/A;  normal    RHINOPLASTY         Social History     Socioeconomic History    Marital status:    Tobacco Use    Smoking status: Never    Smokeless tobacco: Never   Substance and Sexual Activity    Alcohol use: No    Drug use: No     Social Drivers of Health     Financial Resource Strain: Low Risk  (7/1/2024)    Received from [facility]    Overall Financial Resource Strain (CARDIA)     Difficulty of Paying Living Expenses: Not hard at all   Food Insecurity: No Food Insecurity (7/1/2024)    Received from [facility]    Hunger Vital Sign     Worried About Running Out of Food in the Last Year: Never true     Ran Out of Food in the Last Year: Never true   Transportation Needs: No Transportation Needs (7/1/2024)    Received from [facility]    PRAPARE - Transportation     Lack of Transportation (Medical): No     Lack of Transportation (Non-Medical): No   Physical Activity: Unknown (7/1/2024)    Received from [facility]    Exercise Vital Sign     Days of Exercise per Week: 2 days   Stress: No Stress Concern Present (7/1/2024)    Received from [facility]    [place] [place] [employer] - Occupational Stress Questionnaire     Feeling of Stress : Only a little   Housing Stability: Low Risk  (7/1/2024)    Received from [facility]    Housing Stability Vital Sign     Unable to Pay for Housing in the Last Year: No     Number of Places Lived in the Last Year: 1     Unstable Housing in the Last Year: No         Medications/Allergies: See med card    Vitals:    05/19/25 0900   BP: 124/79   Pulse: 80   Weight: 57.7 kg (127 lb 5.1 oz)   Height: 5' 1" (1.549 m)   PainSc:   4   PainLoc: Neck     Body mass index is 24.06 kg/m².    Physical exam:  Gen: A and O x3, pleasant, well-groomed  Skin: No rashes or obvious lesions  HEENT: PERRLA, no obvious deformities on ears or in canals.Trachea midline.  CVS: Regular " rate and rhythm, normal palpable pulses.  Resp: Clear to auscultation bilaterally, no wheezes or rales.  Abdomen: Soft, NT/ND.  Musculoskeletal:  No antalgic gait.  Able to heel and toe walk  Romberg negative    Neuro:  Motor:    Right Left   C4 Shoulder Abduction  5  5   C5 Elbow Flexion    5  5   C6 Wrist Extension  5  5   C7 Elbow Extension   5  5   C8/T1 Hand Intrinsics   5  5   C8 First Dorsal Interosseus  5  5   C8 Abductor Pollicus Brevis  5  5      Left  Right    Triceps DTR 2+ 2+   Biceps DTR 2+ 2+   Brachioradialis DTR 2+ 2+   Patellar DTR 2+ 2+   Achilles DTR 2+ 2+   Olivo Absent  Absent   Clonus Absent Absent     Babinski Absent Absent     Sensory: Intact and symmetrical to light touch and pinprick in C2-T1 dermatomes bilaterally.  Cervical spine: ROM is full in flexion, extension and lateral rotation with increased pain on left lateral rotation and extension, also with flexion  Spurling's maneuver causes no neck pain to either side.  Myofascial exam: No Tenderness to palpation across cervical paraspinous region bilaterally.    Imaging:  We reviewed cervical MRIs from 2019 and in 2022: Last was 10/11/2022 from [facility]: Most notably there are broad-based disc bulges at C5/6 with leftward protrusion at C5/6 towards the cord but right greater than left foraminal narrowing at that level.  At C6/7 there is moderate to severe foraminal narrowing out to the left side.    Assessment:  Kiki Milner is a 59 y.o. year old female patient who has a past medical history of Arm pain, left, PONV (postoperative nausea and vomiting), and Shoulder pain. She presents ineck and left arm pain.    Neck and left arm pain from cervical spondylosis at C5/6, C6/7.     1. Cervical radiculopathy        2. Cervical spondylosis        3. Cervicalgia  MRI Cervical Spine Without Contrast    MRI Cervical Spine Without Contrast    CANCELED: MRI Cervical Spine Without Contrast          Plan:  1. The patient did not have as much relief  following the C6/7 interlaminar epidural steroid injection as she did in the past.  I am going to update her cervical spine MRI.  She is going to have this done at [place] and drop off the disc.  We will contact her with results and recommendations.    This note was generated with the assistance of ambient listening technology. Verbal consent was obtained by the patient and accompanying visitor(s) for the recording of patient appointment to facilitate this note. I attest to having reviewed and edited the generated note for accuracy, though some syntax or spelling errors may persist. Please contact the author of this note for any clarification.

## 2025-05-26 ENCOUNTER — TELEPHONE (OUTPATIENT)
Dept: PAIN MEDICINE | Facility: CLINIC | Age: 60
End: 2025-05-26
Payer: COMMERCIAL

## 2025-05-30 NOTE — TELEPHONE ENCOUNTER
Please let the patient know that I have now reviewed the images that have been uploaded and there have been no major changes in terms of the degree of narrowing in her spinal canal or foramen.  I suggest trying 1 more epidural steroid injection at the level below and if she does not have relief I would like her to be evaluated by Neurosurgery.    Physician - Dr Hammonds      Type of Procedure/Injection - Cervical Epidural  C7/T1           Laterality - NA      Priority - Normal      Anxiolysis - RNIV      Fasting - NPO after midnight      Need to hold medication - Yes      Diclofenac or Ketorolac or Ibuprofen for 1 day      Clearance needed - No      Follow up - 4 week  with Dr Hammonds

## 2025-06-04 DIAGNOSIS — M54.12 CERVICAL RADICULOPATHY: Primary | ICD-10-CM

## 2025-06-04 RX ORDER — SODIUM CHLORIDE, SODIUM LACTATE, POTASSIUM CHLORIDE, CALCIUM CHLORIDE 600; 310; 30; 20 MG/100ML; MG/100ML; MG/100ML; MG/100ML
INJECTION, SOLUTION INTRAVENOUS CONTINUOUS
OUTPATIENT
Start: 2025-06-04

## 2025-07-21 ENCOUNTER — TELEPHONE (OUTPATIENT)
Dept: PAIN MEDICINE | Facility: CLINIC | Age: 60
End: 2025-07-21
Payer: COMMERCIAL

## 2025-07-23 ENCOUNTER — HOSPITAL ENCOUNTER (OUTPATIENT)
Dept: RADIOLOGY | Facility: HOSPITAL | Age: 60
Discharge: HOME | End: 2025-07-23
Attending: ANESTHESIOLOGY | Admitting: ANESTHESIOLOGY
Payer: COMMERCIAL

## 2025-07-23 ENCOUNTER — HOSPITAL ENCOUNTER (OUTPATIENT)
Facility: HOSPITAL | Age: 60
Discharge: HOME | End: 2025-07-23
Attending: ANESTHESIOLOGY | Admitting: ANESTHESIOLOGY
Payer: COMMERCIAL

## 2025-07-23 VITALS
HEART RATE: 75 BPM | BODY MASS INDEX: 22.66 KG/M2 | DIASTOLIC BLOOD PRESSURE: 65 MMHG | SYSTOLIC BLOOD PRESSURE: 112 MMHG | RESPIRATION RATE: 16 BRPM | HEIGHT: 61 IN | TEMPERATURE: 97 F | WEIGHT: 120 LBS | OXYGEN SATURATION: 95 %

## 2025-07-23 DIAGNOSIS — M54.12 CERVICAL RADICULOPATHY: ICD-10-CM

## 2025-07-23 DIAGNOSIS — M54.2 NECK PAIN: ICD-10-CM

## 2025-07-23 PROCEDURE — 62321 NJX INTERLAMINAR CRV/THRC: CPT | Mod: PO | Performed by: ANESTHESIOLOGY

## 2025-07-23 PROCEDURE — 25000003 PHARM REV CODE 250: Mod: PO | Performed by: ANESTHESIOLOGY

## 2025-07-23 PROCEDURE — 62321 NJX INTERLAMINAR CRV/THRC: CPT | Mod: ,,, | Performed by: ANESTHESIOLOGY

## 2025-07-23 PROCEDURE — A4216 STERILE WATER/SALINE, 10 ML: HCPCS | Mod: PO | Performed by: ANESTHESIOLOGY

## 2025-07-23 PROCEDURE — 63600175 PHARM REV CODE 636 W HCPCS: Mod: PO | Performed by: ANESTHESIOLOGY

## 2025-07-23 PROCEDURE — 25500020 PHARM REV CODE 255: Mod: PO | Performed by: ANESTHESIOLOGY

## 2025-07-23 RX ORDER — METHYLPREDNISOLONE ACETATE 80 MG/ML
INJECTION, SUSPENSION INTRA-ARTICULAR; INTRALESIONAL; INTRAMUSCULAR; SOFT TISSUE
Status: DISCONTINUED | OUTPATIENT
Start: 2025-07-23 | End: 2025-07-23 | Stop reason: HOSPADM

## 2025-07-23 RX ORDER — ONDANSETRON HYDROCHLORIDE 2 MG/ML
INJECTION, SOLUTION INTRAVENOUS
Status: DISCONTINUED | OUTPATIENT
Start: 2025-07-23 | End: 2025-07-23 | Stop reason: HOSPADM

## 2025-07-23 RX ORDER — SODIUM CHLORIDE, SODIUM LACTATE, POTASSIUM CHLORIDE, CALCIUM CHLORIDE 600; 310; 30; 20 MG/100ML; MG/100ML; MG/100ML; MG/100ML
INJECTION, SOLUTION INTRAVENOUS CONTINUOUS
Status: DISCONTINUED | OUTPATIENT
Start: 2025-07-23 | End: 2025-07-23 | Stop reason: HOSPADM

## 2025-07-23 RX ORDER — LIDOCAINE HYDROCHLORIDE 10 MG/ML
INJECTION, SOLUTION EPIDURAL; INFILTRATION; INTRACAUDAL; PERINEURAL
Status: DISCONTINUED | OUTPATIENT
Start: 2025-07-23 | End: 2025-07-23 | Stop reason: HOSPADM

## 2025-07-23 RX ORDER — MIDAZOLAM HYDROCHLORIDE 1 MG/ML
INJECTION INTRAMUSCULAR; INTRAVENOUS
Status: DISCONTINUED | OUTPATIENT
Start: 2025-07-23 | End: 2025-07-23 | Stop reason: HOSPADM

## 2025-07-23 RX ORDER — SODIUM CHLORIDE 9 MG/ML
INJECTION, SOLUTION INTRAMUSCULAR; INTRAVENOUS; SUBCUTANEOUS
Status: DISCONTINUED | OUTPATIENT
Start: 2025-07-23 | End: 2025-07-23 | Stop reason: HOSPADM

## 2025-07-23 RX ORDER — SODIUM CHLORIDE 9 MG/ML
INJECTION, SOLUTION INTRAVENOUS CONTINUOUS
Status: DISCONTINUED | OUTPATIENT
Start: 2025-07-23 | End: 2025-07-23 | Stop reason: HOSPADM

## 2025-07-23 RX ADMIN — SODIUM CHLORIDE: 9 INJECTION, SOLUTION INTRAVENOUS at 03:07

## 2025-07-23 NOTE — H&P
CC: Neck pain    HPI: The patient is a 60yo woman with a history of cervical radiculopathy here for C7/T1 JEFFREY. There are no major changes in history and physical from 5/19/25.    Past Medical History:   Diagnosis Date    Arm pain, left     PONV (postoperative nausea and vomiting)     Shoulder pain        Past Surgical History:   Procedure Laterality Date    breast augmentation      EPIDURAL STEROID INJECTION INTO CERVICAL SPINE N/A 1/15/2020    Procedure: INJECTION, STEROID, SPINE, CERVICAL, EPIDURAL c7-T1;  Surgeon: Bryant Kaur MD;  Location: [facility];  Service: Pain Management;  Laterality: N/A;    EPIDURAL STEROID INJECTION INTO CERVICAL SPINE N/A 9/1/2023    Procedure: Injection-steroid-epidural-cervical C6/7 TO LEFT;  Surgeon: Wayne Hammonds MD;  Location: [facility];  Service: Pain Management;  Laterality: N/A;    EPIDURAL STEROID INJECTION INTO CERVICAL SPINE N/A 4/28/2025    Procedure: Injection-steroid-epidural-cervical      C6/7 to the left;  Surgeon: Wayne Hammonds MD;  Location: [facility];  Service: Pain Management;  Laterality: N/A;  normal    RHINOPLASTY         Family History   Problem Relation Name Age of Onset    No Known Problems Mother      Anesthesia problems Neg Hx      Clotting disorder Neg Hx         Social History     Socioeconomic History    Marital status:    Tobacco Use    Smoking status: Never    Smokeless tobacco: Never   Substance and Sexual Activity    Alcohol use: No    Drug use: No     Social Drivers of Health     Financial Resource Strain: Low Risk  (7/7/2025)    Received from [facility]    Overall Financial Resource Strain (CARDIA)     How hard is it for you to pay for the very basics like food, housing, medical care, and heating?: Not hard at all   Food Insecurity: No Food Insecurity (7/7/2025)    Received from [facility]    Hunger Vital Sign     Worried About Running Out of Food in the Last Year: Never true     Ran Out of Food in the Last Year: Never true  "  Transportation Needs: No Transportation Needs (7/7/2025)    Received from [facility]    PRAPARE - Transportation     In the past 12 months, has lack of transportation kept you from medical appointments or from getting medications?: No     In the past 12 months, has lack of transportation kept you from meetings, work, or from getting things needed for daily living?: No   Physical Activity: Insufficiently Active (7/7/2025)    Received from [facility]    Exercise Vital Sign     Days of Exercise per Week: 2 days     Minutes of Exercise per Session: 40 min   Stress: No Stress Concern Present (7/7/2025)    Received from [facility]    [employer] [place] [employer] - Occupational Stress Questionnaire     Do you feel stress - tense, restless, nervous, or anxious, or unable to sleep at night because your mind is troubled all the time - these days?: Only a little   Housing Stability: Unknown (7/7/2025)    Received from [facility]    Housing Stability Vital Sign     Unable to Pay for Housing in the Last Year: No     Homeless in the Last Year: No       Current Medications[1]    Review of patient's allergies indicates:  No Known Allergies    Vitals:    07/21/25 0803 07/21/25 0804 07/23/25 1531   BP:   134/72   Pulse:   72   Resp:   18   Temp:   97.2 °F (36.2 °C)   TempSrc:   Skin   SpO2:   99%   Weight: 54.4 kg (120 lb)  54.4 kg (120 lb)   Height:  5' 1" (1.549 m) 5' 1" (1.549 m)       ASA 2, Mallampati 2    REVIEW OF SYSTEMS:     GENERAL: No weight loss, malaise or fevers.  HEENT:  No recent changes in vision or hearing  NECK: Negative for lumps, no difficulty with swallowing.  RESPIRATORY: Negative for cough, wheezing or shortness of breath, patient denies any recent URI.  CARDIOVASCULAR: Negative for chest pain, leg swelling or palpitations.  GI: Negative for abdominal discomfort, blood in stools or black stools or change in bowel habits.  MUSCULOSKELETAL: See HPI.  SKIN: Negative for lesions, rash, and " itching.  PSYCH: No suicidal or homicidal ideations, no current mood disturbances.  HEMATOLOGY/LYMPHOLOGY: Negative for prolonged bleeding, bruising easily or swollen nodes. Patient is not currently taking any anti-coagulants  ENDO: No history of diabetes or thyroid dysfunction  NEURO: No history of syncope, paralysis, seizures or tremors.All other reviewed and negative other than HPI.    Physical exam:  Gen: A and O x3, pleasant, well-groomed  Skin: No rashes or obvious lesions  HEENT: PERRLA, no obvious deformities on ears or in canals. No thyroid masses, trachea midline, no palpable lymph nodes in neck, axilla.  CVS: Regular rate and rhythm, normal S1 and S2, no murmurs.  Resp: Clear to auscultation bilaterally.  Abdomen: Soft, NT/ND, normal bowel sounds present.  Musculoskeletal/Neuro: Moving all extremities    Assessment:  Cervical radiculopathy  -     Place in Outpatient; Standing  -     Vital signs; Standing  -     Place 18-22 gauage peripheral IV ; Standing  -     Verify informed consent; Standing  -     Notify physician ; Standing  -     Notify physician ; Standing  -     Notify physician (specify); Standing  -     Notify physician (specify); Standing  -     Notify physician (specify); Standing  -     Diet NPO; Standing  -     lactated ringers infusion    Other orders  -     IP VTE LOW RISK PATIENT; Standing               [1]   Current Facility-Administered Medications   Medication Dose Route Frequency Provider Last Rate Last Admin    lactated ringers infusion   Intravenous Continuous Wayne Hammonds MD

## 2025-07-23 NOTE — OP NOTE
PROCEDURE DATE: 7/23/2025    Procedure: C7-T1 cervical interlaminar epidural steroid injection under utilizing fluoroscopy.    Diagnosis: Cervical Radiculopathy    POSTOP DIAGNOSIS: SAME    Physician: Wayne Hammonds MD    Medications injected:  Methylprednisone 80mg followed by a slow injection of 4 mL sterile, preservative-free normal saline.    Local anesthetic used: Lidocaine 1%, 4 ml.    Sedation Medications: 2mg versed    Complications:  none    Estimated blood loss: none    Technique:  A time-out was taken to identify patient and procedure prior to starting the procedure.  With the patient laying in a prone position with the neck in a mid-flexed forward position, the area was prepped and draped in the usual sterile fashion using ChloraPrep and a fenestrated drape.  The area was determined under AP fluoroscopic guidance.  Local anesthetic was given using a 25-gauge 1.5 inch needle by raising a wheal and then infiltrating ventrally.  A 3.5 inch 20-gauge Touhy needle was introduced under fluoroscopic guidance to meet the lamina of C7.  The needle was then hinged under the lamina then advanced using loss of resistance technique.  Once the tip of the needle was in the desired position, the contrast dye Omnipaque was injected to determine placement and no uptake.  The steroid was then injected slowly followed by a slow injection of 4 mL of the sterile preservative-free normal saline.  The patient tolerated the procedure well.    The patient was monitored after the procedure and was given post-procedure and discharge instructions to follow at home. The patient was discharged in a stable condition.

## 2025-07-23 NOTE — DISCHARGE SUMMARY
[facility] - Surgery  Discharge Note  Short Stay    Procedure(s) (LRB):  Injection-steroid-epidural-cervical C7/T1 (N/A)      OUTCOME: Patient tolerated treatment/procedure well without complication and is now ready for discharge.    DISPOSITION: Home or Self Care    FINAL DIAGNOSIS:  Cervical radiculopathy    FOLLOWUP: In clinic    DISCHARGE INSTRUCTIONS:    Discharge Procedure Orders   Diet Adult Regular     No dressing needed     Notify your health care provider if you experience any of the following:  temperature >100.4     Activity as tolerated

## 2025-08-18 ENCOUNTER — OFFICE VISIT (OUTPATIENT)
Dept: PAIN MEDICINE | Facility: CLINIC | Age: 60
End: 2025-08-18
Payer: COMMERCIAL

## 2025-08-18 VITALS
WEIGHT: 127.88 LBS | DIASTOLIC BLOOD PRESSURE: 62 MMHG | BODY MASS INDEX: 24.16 KG/M2 | SYSTOLIC BLOOD PRESSURE: 128 MMHG | HEART RATE: 84 BPM

## 2025-08-18 DIAGNOSIS — M47.812 CERVICAL SPONDYLOSIS: ICD-10-CM

## 2025-08-18 DIAGNOSIS — M54.12 CERVICAL RADICULOPATHY: Primary | ICD-10-CM

## 2025-08-18 PROCEDURE — 1159F MED LIST DOCD IN RCRD: CPT | Mod: CPTII,S$GLB,, | Performed by: ANESTHESIOLOGY

## 2025-08-18 PROCEDURE — 3074F SYST BP LT 130 MM HG: CPT | Mod: CPTII,S$GLB,, | Performed by: ANESTHESIOLOGY

## 2025-08-18 PROCEDURE — 3078F DIAST BP <80 MM HG: CPT | Mod: CPTII,S$GLB,, | Performed by: ANESTHESIOLOGY

## 2025-08-18 PROCEDURE — 99214 OFFICE O/P EST MOD 30 MIN: CPT | Mod: S$GLB,,, | Performed by: ANESTHESIOLOGY

## 2025-08-18 PROCEDURE — 3008F BODY MASS INDEX DOCD: CPT | Mod: CPTII,S$GLB,, | Performed by: ANESTHESIOLOGY

## 2025-08-18 PROCEDURE — 99999 PR PBB SHADOW E&M-EST. PATIENT-LVL III: CPT | Mod: PBBFAC,,, | Performed by: ANESTHESIOLOGY

## (undated) DEVICE — NDL TUOHY EPIDURAL 20G X 3.5

## (undated) DEVICE — TRAY NERVE BLOCK

## (undated) DEVICE — MARKER SKIN RULER STERILE

## (undated) DEVICE — GLOVE SENSICARE PI MICRO 7

## (undated) DEVICE — APPLICATOR CHLORAPREP CLR 10.5

## (undated) DEVICE — SYR GLASS 5CC LUER LOK

## (undated) DEVICE — MARKER SKIN STND TIP BLUE BARR

## (undated) DEVICE — GLOVE SURGICAL LATEX SZ 7